# Patient Record
Sex: FEMALE | Race: WHITE
[De-identification: names, ages, dates, MRNs, and addresses within clinical notes are randomized per-mention and may not be internally consistent; named-entity substitution may affect disease eponyms.]

---

## 2017-04-06 ENCOUNTER — HOSPITAL ENCOUNTER (EMERGENCY)
Dept: HOSPITAL 82 - ED | Age: 72
Discharge: HOME | End: 2017-04-06
Payer: MEDICARE

## 2017-04-06 VITALS — DIASTOLIC BLOOD PRESSURE: 83 MMHG | SYSTOLIC BLOOD PRESSURE: 150 MMHG

## 2017-04-06 VITALS — WEIGHT: 167.33 LBS | HEIGHT: 58 IN | BODY MASS INDEX: 35.12 KG/M2
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
## 2017-04-06 DIAGNOSIS — S02.32XA: Primary | ICD-10-CM

## 2017-04-06 DIAGNOSIS — S01.112A: ICD-10-CM

## 2017-04-06 DIAGNOSIS — I10: ICD-10-CM
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
## 2017-04-06 LAB
ALBUMIN SERPL-MCNC: 3.8 G/DL (ref 3.2–5)
ALP SERPL-CCNC: 52 U/L (ref 38–126)
ALT SERPL-CCNC: 46 U/L (ref 11–66)
ANION GAP SERPL CALCULATED.3IONS-SCNC: 14 MMOL/L
AST SERPL-CCNC: 28 U/L (ref 9–36)
BASOPHILS NFR BLD AUTO: 0 % (ref 0–3)
BUN SERPL-MCNC: 23 MG/DL (ref 8–23)
BUN/CREAT SERPL: 19
CALCIUM SERPL-MCNC: 9.7 MG/DL (ref 8.4–10.2)
CHLORIDE SERPL-SCNC: 107 MMOL/L (ref 95–108)
CO2 SERPL-SCNC: 28 MMOL/L (ref 22–30)
CREAT SERPL-MCNC: 1.2 MG/DL (ref 0.5–1)
EOSINOPHIL NFR BLD AUTO: 4 % (ref 0–8)
ERYTHROCYTE [DISTWIDTH] IN BLOOD BY AUTOMATED COUNT: 12.4 % (ref 11.5–15.5)
GLUCOSE SERPL-MCNC: 108 MG/DL (ref 82–115)
HCT VFR BLD AUTO: 40.3 % (ref 37–47)
HGB BLD-MCNC: 13.5 G/DL (ref 12–16)
IMM GRANULOCYTES NFR BLD: 0.2 % (ref 0–1)
LYMPHOCYTES NFR BLD: 30 % (ref 15–41)
MCH RBC QN AUTO: 31.1 PG  CALC (ref 26–32)
MCHC RBC AUTO-ENTMCNC: 33.5 G/L CALC (ref 32–36)
MCV RBC AUTO: 92.9 FL  CALC (ref 80–100)
MONOCYTES NFR BLD AUTO: 8 % (ref 2–13)
NEUTROPHILS # BLD AUTO: 2.75 THOU/UL (ref 2–7.15)
NEUTROPHILS NFR BLD AUTO: 57 % (ref 42–76)
PLATELET # BLD AUTO: 151 THOU/UL (ref 130–400)
POTASSIUM SERPL-SCNC: 4 MMOL/L (ref 3.5–5.1)
PROT SERPL-MCNC: 6.6 G/DL (ref 6.3–8.2)
RBC # BLD AUTO: 4.34 MILL/UL (ref 4.2–5.6)
SODIUM SERPL-SCNC: 145 MMOL/L (ref 137–146)

## 2017-04-06 PROCEDURE — 0HQ1XZZ REPAIR FACE SKIN, EXTERNAL APPROACH: ICD-10-PCS | Performed by: EMERGENCY MEDICINE

## 2017-04-20 ENCOUNTER — HOSPITAL ENCOUNTER (OUTPATIENT)
Dept: HOSPITAL 82 - ED | Age: 72
Setting detail: OBSERVATION
LOS: 1 days | Discharge: HOME | End: 2017-04-21
Payer: MEDICARE

## 2017-04-20 VITALS — BODY MASS INDEX: 35.36 KG/M2 | WEIGHT: 168.44 LBS | HEIGHT: 58 IN

## 2017-04-20 VITALS — SYSTOLIC BLOOD PRESSURE: 163 MMHG | DIASTOLIC BLOOD PRESSURE: 85 MMHG

## 2017-04-20 VITALS — SYSTOLIC BLOOD PRESSURE: 155 MMHG | DIASTOLIC BLOOD PRESSURE: 80 MMHG

## 2017-04-20 DIAGNOSIS — R53.1: Primary | ICD-10-CM

## 2017-04-20 DIAGNOSIS — E87.5: ICD-10-CM

## 2017-04-20 DIAGNOSIS — E86.0: ICD-10-CM

## 2017-04-20 DIAGNOSIS — I10: ICD-10-CM

## 2017-04-20 DIAGNOSIS — R94.31: ICD-10-CM

## 2017-04-20 DIAGNOSIS — R47.01: ICD-10-CM

## 2017-04-20 DIAGNOSIS — R94.6: ICD-10-CM

## 2017-04-20 DIAGNOSIS — Z79.899: ICD-10-CM

## 2017-04-20 LAB
ALBUMIN SERPL-MCNC: 3.3 G/DL (ref 3.2–5)
ALP SERPL-CCNC: 42 U/L (ref 38–126)
ALT SERPL-CCNC: 41 U/L (ref 11–66)
ANION GAP SERPL CALCULATED.3IONS-SCNC: 15 MMOL/L
AST SERPL-CCNC: 25 U/L (ref 9–36)
BASOPHILS NFR BLD AUTO: 0 % (ref 0–3)
BILIRUB UR QL STRIP.AUTO: NEGATIVE
BUN SERPL-MCNC: 60 MG/DL (ref 8–23)
BUN/CREAT SERPL: 58
CALCIUM SERPL-MCNC: 9.1 MG/DL (ref 8.4–10.2)
CHLORIDE SERPL-SCNC: 103 MMOL/L (ref 95–108)
CLARITY UR: CLEAR
CO2 SERPL-SCNC: 25 MMOL/L (ref 22–30)
COLOR UR AUTO: YELLOW
CREAT SERPL-MCNC: 1 MG/DL (ref 0.5–1)
EOSINOPHIL NFR BLD AUTO: 0 % (ref 0–8)
ERYTHROCYTE [DISTWIDTH] IN BLOOD BY AUTOMATED COUNT: 13.2 % (ref 11.5–15.5)
GLUCOSE SERPL-MCNC: 167 MG/DL (ref 82–115)
GLUCOSE UR STRIP.AUTO-MCNC: NEGATIVE MG/DL
HCT VFR BLD AUTO: 46.9 % (ref 37–47)
HGB BLD-MCNC: 15.6 G/DL (ref 12–16)
HGB UR QL STRIP.AUTO: NEGATIVE
IMM GRANULOCYTES NFR BLD: 1.5 % (ref 0–1)
INR PPP: 1 RATIO (ref 0.7–1.3)
KETONES UR STRIP.AUTO-MCNC: NEGATIVE MG/DL
LEUKOCYTE ESTERASE UR QL STRIP.AUTO: NEGATIVE
LYMPHOCYTES NFR BLD: 2 % (ref 15–41)
MAGNESIUM SERPL-MCNC: 2.3 MG/DL (ref 1.6–2.3)
MCH RBC QN AUTO: 31.5 PG  CALC (ref 26–32)
MCHC RBC AUTO-ENTMCNC: 33.3 G/L CALC (ref 32–36)
MCV RBC AUTO: 94.6 FL  CALC (ref 80–100)
MONOCYTES NFR BLD AUTO: 4 % (ref 2–13)
MYOGLOBIN SERPL-MCNC: 61 NG/ML (ref 0–62)
NEUTROPHILS # BLD AUTO: 12.51 THOU/UL (ref 2–7.15)
NEUTROPHILS NFR BLD AUTO: 92 % (ref 42–76)
NITRITE UR QL STRIP.AUTO: NEGATIVE
PH UR STRIP.AUTO: 5.5 [PH] (ref 4.5–8)
PLATELET # BLD AUTO: 207 THOU/UL (ref 130–400)
POTASSIUM SERPL-SCNC: 5.6 MMOL/L (ref 3.5–5.1)
PROT SERPL-MCNC: 6.2 G/DL (ref 6.3–8.2)
PROT UR STRIP.AUTO-MCNC: NEGATIVE MG/DL
PROTHROMBIN TIME: 10.9 SECONDS (ref 9–12.5)
RBC # BLD AUTO: 4.96 MILL/UL (ref 4.2–5.6)
SODIUM SERPL-SCNC: 137 MMOL/L (ref 137–146)
SP GR UR STRIP.AUTO: 1.02
UROBILINOGEN UR QL STRIP.AUTO: 0.2 E.U./DL

## 2017-04-21 VITALS — DIASTOLIC BLOOD PRESSURE: 87 MMHG | SYSTOLIC BLOOD PRESSURE: 153 MMHG

## 2017-04-21 VITALS — DIASTOLIC BLOOD PRESSURE: 76 MMHG | SYSTOLIC BLOOD PRESSURE: 137 MMHG

## 2017-04-21 VITALS — SYSTOLIC BLOOD PRESSURE: 141 MMHG | DIASTOLIC BLOOD PRESSURE: 64 MMHG

## 2017-04-21 VITALS — SYSTOLIC BLOOD PRESSURE: 145 MMHG | DIASTOLIC BLOOD PRESSURE: 68 MMHG

## 2017-04-21 LAB
ANION GAP SERPL CALCULATED.3IONS-SCNC: 12 MMOL/L
BASOPHILS NFR BLD AUTO: 0 % (ref 0–3)
BUN SERPL-MCNC: 48 MG/DL (ref 8–23)
BUN/CREAT SERPL: 55
CALCIUM SERPL-MCNC: 8.6 MG/DL (ref 8.4–10.2)
CHLORIDE SERPL-SCNC: 104 MMOL/L (ref 95–108)
CO2 SERPL-SCNC: 26 MMOL/L (ref 22–30)
CREAT SERPL-MCNC: 0.9 MG/DL (ref 0.5–1)
EOSINOPHIL NFR BLD AUTO: 0 % (ref 0–8)
ERYTHROCYTE [DISTWIDTH] IN BLOOD BY AUTOMATED COUNT: 13 % (ref 11.5–15.5)
GLUCOSE SERPL-MCNC: 134 MG/DL (ref 82–115)
HCT VFR BLD AUTO: 40.9 % (ref 37–47)
HGB BLD-MCNC: 13.6 G/DL (ref 12–16)
IMM GRANULOCYTES NFR BLD: 1.5 % (ref 0–1)
LYMPHOCYTES NFR BLD: 4 % (ref 15–41)
MAGNESIUM SERPL-MCNC: 2 MG/DL (ref 1.6–2.3)
MCH RBC QN AUTO: 31.6 PG  CALC (ref 26–32)
MCHC RBC AUTO-ENTMCNC: 33.3 G/L CALC (ref 32–36)
MCV RBC AUTO: 94.9 FL  CALC (ref 80–100)
MONOCYTES NFR BLD AUTO: 8 % (ref 2–13)
NEUTROPHILS # BLD AUTO: 8.05 THOU/UL (ref 2–7.15)
NEUTROPHILS NFR BLD AUTO: 86 % (ref 42–76)
PLATELET # BLD AUTO: 133 THOU/UL (ref 130–400)
POTASSIUM SERPL-SCNC: 4.6 MMOL/L (ref 3.5–5.1)
RBC # BLD AUTO: 4.31 MILL/UL (ref 4.2–5.6)
SODIUM SERPL-SCNC: 138 MMOL/L (ref 137–146)

## 2019-03-04 ENCOUNTER — HOSPITAL ENCOUNTER (OUTPATIENT)
Dept: HOSPITAL 82 - LAB | Age: 74
Discharge: HOME | End: 2019-03-04
Attending: INTERNAL MEDICINE
Payer: MEDICARE

## 2019-03-04 DIAGNOSIS — E05.90: ICD-10-CM

## 2019-03-04 DIAGNOSIS — I10: Primary | ICD-10-CM

## 2019-03-04 DIAGNOSIS — E78.49: ICD-10-CM

## 2019-03-04 LAB
ALBUMIN SERPL-MCNC: 4.1 G/DL (ref 3.2–5)
ALP SERPL-CCNC: 57 U/L (ref 38–126)
ANION GAP SERPL CALCULATED.3IONS-SCNC: 13 MMOL/L
AST SERPL-CCNC: 23 U/L (ref 9–36)
BASOPHILS NFR BLD AUTO: 0 % (ref 0–3)
BUN SERPL-MCNC: 24 MG/DL (ref 8–23)
BUN/CREAT SERPL: 19
CHLORIDE SERPL-SCNC: 105 MMOL/L (ref 95–108)
CHOLEST SERPL-MCNC: 186 MG/DL (ref 0–199)
CHOLEST/HDLC SERPL: 3.5 {RATIO}
CO2 SERPL-SCNC: 27 MMOL/L (ref 22–30)
CREAT SERPL-MCNC: 1.2 MG/DL (ref 0.5–1)
EOSINOPHIL NFR BLD AUTO: 3 % (ref 0–8)
ERYTHROCYTE [DISTWIDTH] IN BLOOD BY AUTOMATED COUNT: 12.5 % (ref 11.5–15.5)
HCT VFR BLD AUTO: 42.8 % (ref 37–47)
HDLC SERPL-MCNC: 53 MG/DL (ref 40–?)
HGB BLD-MCNC: 14 G/DL (ref 12–16)
IMM GRANULOCYTES NFR BLD: 0.2 % (ref 0–5)
LDLC SERPL CALC-MCNC: 103 MG/DL
LYMPHOCYTES NFR BLD: 33 % (ref 15–41)
MCH RBC QN AUTO: 31.8 PG  CALC (ref 26–32)
MCHC RBC AUTO-ENTMCNC: 32.7 G/L CALC (ref 32–36)
MCV RBC AUTO: 97.3 FL  CALC (ref 80–100)
MONOCYTES NFR BLD AUTO: 7 % (ref 2–13)
NEUTROPHILS # BLD AUTO: 2.74 THOU/UL (ref 2–7.15)
NEUTROPHILS NFR BLD AUTO: 56 % (ref 42–76)
PLATELET # BLD AUTO: 163 THOU/UL (ref 130–400)
POTASSIUM SERPL-SCNC: 4.4 MMOL/L (ref 3.5–5.1)
PROT SERPL-MCNC: 6.4 G/DL (ref 6.3–8.2)
RBC # BLD AUTO: 4.4 MILL/UL (ref 4.2–5.6)
SODIUM SERPL-SCNC: 141 MMOL/L (ref 137–146)
TRIGL SERPL-MCNC: 152 MG/DL (ref 30–149)
TSH SERPL DL<=0.05 MIU/L-ACNC: 0.42 UIU/ML (ref 0.47–4.68)
VLDLC SERPL CALC-MCNC: 30 MG/DL

## 2019-12-04 ENCOUNTER — HOSPITAL ENCOUNTER (EMERGENCY)
Dept: HOSPITAL 82 - ED | Age: 74
Discharge: HOME | End: 2019-12-04
Payer: MEDICARE

## 2019-12-04 VITALS — SYSTOLIC BLOOD PRESSURE: 141 MMHG | DIASTOLIC BLOOD PRESSURE: 75 MMHG

## 2019-12-04 VITALS — BODY MASS INDEX: 31.47 KG/M2 | HEIGHT: 58 IN | WEIGHT: 149.91 LBS

## 2019-12-04 DIAGNOSIS — B34.9: Primary | ICD-10-CM

## 2019-12-04 DIAGNOSIS — J44.9: ICD-10-CM

## 2019-12-04 LAB
BASOPHILS NFR BLD AUTO: 1 % (ref 0–3)
EOSINOPHIL NFR BLD AUTO: 5 % (ref 0–8)
ERYTHROCYTE [DISTWIDTH] IN BLOOD BY AUTOMATED COUNT: 11.9 % (ref 11.5–15.5)
HCT VFR BLD AUTO: 39.6 % (ref 37–47)
HGB BLD-MCNC: 13 G/DL (ref 12–16)
IMM GRANULOCYTES NFR BLD: 0.2 % (ref 0–5)
LYMPHOCYTES NFR BLD: 31 % (ref 15–41)
MCH RBC QN AUTO: 31.1 PG  CALC (ref 26–32)
MCHC RBC AUTO-ENTMCNC: 32.8 G/L CALC (ref 32–36)
MCV RBC AUTO: 94.7 FL  CALC (ref 80–100)
MONOCYTES NFR BLD AUTO: 9 % (ref 2–13)
NEUTROPHILS # BLD AUTO: 2.86 THOU/UL (ref 2–7.15)
NEUTROPHILS NFR BLD AUTO: 54 % (ref 42–76)
PLATELET # BLD AUTO: 216 THOU/UL (ref 130–400)
RBC # BLD AUTO: 4.18 MILL/UL (ref 4.2–5.6)

## 2021-01-31 ENCOUNTER — HOSPITAL ENCOUNTER (EMERGENCY)
Dept: HOSPITAL 82 - ED | Age: 76
Discharge: HOME | End: 2021-01-31
Payer: MEDICARE

## 2021-01-31 VITALS — HEIGHT: 58 IN | WEIGHT: 149.91 LBS | BODY MASS INDEX: 31.47 KG/M2

## 2021-01-31 VITALS — DIASTOLIC BLOOD PRESSURE: 56 MMHG | SYSTOLIC BLOOD PRESSURE: 100 MMHG

## 2021-01-31 DIAGNOSIS — R47.01: ICD-10-CM

## 2021-01-31 DIAGNOSIS — T17.998A: Primary | ICD-10-CM
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
## 2021-01-31 DIAGNOSIS — J44.9: ICD-10-CM
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
## 2021-01-31 LAB
ANION GAP SERPL CALCULATED.3IONS-SCNC: 13 MMOL/L
BASOPHILS NFR BLD AUTO: 1 % (ref 0–3)
BUN SERPL-MCNC: 43 MG/DL (ref 8–23)
BUN/CREAT SERPL: 27
CHLORIDE SERPL-SCNC: 115 MMOL/L (ref 95–108)
CO2 SERPL-SCNC: 27 MMOL/L (ref 22–30)
CREAT SERPL-MCNC: 1.6 MG/DL (ref 0.5–1)
EOSINOPHIL NFR BLD AUTO: 2 % (ref 0–8)
ERYTHROCYTE [DISTWIDTH] IN BLOOD BY AUTOMATED COUNT: 12.8 % (ref 11.5–15.5)
HCT VFR BLD AUTO: 47.7 % (ref 37–47)
HGB BLD-MCNC: 14.8 G/DL (ref 12–16)
IMM GRANULOCYTES NFR BLD: 0.2 % (ref 0–5)
LYMPHOCYTES NFR BLD: 25 % (ref 15–41)
MCH RBC QN AUTO: 31.6 PG  CALC (ref 26–32)
MCHC RBC AUTO-ENTMCNC: 31 G/DL CAL (ref 32–36)
MCV RBC AUTO: 101.7 FL  CALC (ref 80–100)
MONOCYTES NFR BLD AUTO: 8 % (ref 2–13)
NEUTROPHILS # BLD AUTO: 5.6 THOU/UL (ref 2–7.15)
NEUTROPHILS NFR BLD AUTO: 64 % (ref 42–76)
PLATELET # BLD AUTO: 253 THOU/UL (ref 130–400)
POTASSIUM SERPL-SCNC: 4.3 MMOL/L (ref 3.5–5.1)
RBC # BLD AUTO: 4.69 MILL/UL (ref 4.2–5.6)
SODIUM SERPL-SCNC: 151 MMOL/L (ref 137–146)

## 2021-02-18 ENCOUNTER — HOSPITAL ENCOUNTER (INPATIENT)
Dept: HOSPITAL 82 - ED | Age: 76
LOS: 6 days | Discharge: HOSPICE | DRG: 640 | End: 2021-02-24
Attending: INTERNAL MEDICINE | Admitting: INTERNAL MEDICINE
Payer: MEDICARE

## 2021-02-18 VITALS — BODY MASS INDEX: 27.87 KG/M2 | WEIGHT: 138.23 LBS | HEIGHT: 59 IN

## 2021-02-18 VITALS — DIASTOLIC BLOOD PRESSURE: 63 MMHG | SYSTOLIC BLOOD PRESSURE: 125 MMHG

## 2021-02-18 DIAGNOSIS — Z51.5: ICD-10-CM

## 2021-02-18 DIAGNOSIS — E87.6: ICD-10-CM

## 2021-02-18 DIAGNOSIS — E86.0: ICD-10-CM

## 2021-02-18 DIAGNOSIS — G31.01: ICD-10-CM

## 2021-02-18 DIAGNOSIS — U07.1: ICD-10-CM

## 2021-02-18 DIAGNOSIS — N18.30: ICD-10-CM

## 2021-02-18 DIAGNOSIS — J44.9: ICD-10-CM

## 2021-02-18 DIAGNOSIS — Z85.42: ICD-10-CM

## 2021-02-18 DIAGNOSIS — E86.9: ICD-10-CM

## 2021-02-18 DIAGNOSIS — J69.0: ICD-10-CM

## 2021-02-18 DIAGNOSIS — N17.9: ICD-10-CM

## 2021-02-18 DIAGNOSIS — M10.9: ICD-10-CM

## 2021-02-18 DIAGNOSIS — E03.9: ICD-10-CM

## 2021-02-18 DIAGNOSIS — K29.70: ICD-10-CM

## 2021-02-18 DIAGNOSIS — R53.1: ICD-10-CM

## 2021-02-18 DIAGNOSIS — I12.9: ICD-10-CM

## 2021-02-18 DIAGNOSIS — E87.0: Primary | ICD-10-CM

## 2021-02-18 DIAGNOSIS — M62.82: ICD-10-CM

## 2021-02-18 DIAGNOSIS — Z99.3: ICD-10-CM

## 2021-02-18 DIAGNOSIS — Z66: ICD-10-CM

## 2021-02-18 DIAGNOSIS — F02.80: ICD-10-CM

## 2021-02-18 LAB
ALBUMIN SERPL-MCNC: 3.6 G/DL (ref 3.2–5)
ALP SERPL-CCNC: 82 U/L (ref 38–126)
ANION GAP SERPL CALCULATED.3IONS-SCNC: 14 MMOL/L
ANION GAP SERPL CALCULATED.3IONS-SCNC: 6 MMOL/L
AST SERPL-CCNC: 82 U/L (ref 9–36)
BASOPHILS NFR BLD AUTO: 0 % (ref 0–3)
BILIRUB UR QL STRIP.AUTO: NEGATIVE
BUN SERPL-MCNC: 101 MG/DL (ref 8–23)
BUN SERPL-MCNC: 86 MG/DL (ref 8–23)
BUN/CREAT SERPL: 34
BUN/CREAT SERPL: 34
CHLORIDE SERPL-SCNC: 134 MMOL/L (ref 95–108)
CHLORIDE SERPL-SCNC: 136 MMOL/L (ref 95–108)
CO2 SERPL-SCNC: 24 MMOL/L (ref 22–30)
CO2 SERPL-SCNC: 28 MMOL/L (ref 22–30)
COLOR UR AUTO: YELLOW
CREAT SERPL-MCNC: 2.5 MG/DL (ref 0.5–1)
CREAT SERPL-MCNC: 3 MG/DL (ref 0.5–1)
EOSINOPHIL NFR BLD AUTO: 0 % (ref 0–8)
ERYTHROCYTE [DISTWIDTH] IN BLOOD BY AUTOMATED COUNT: 13.9 % (ref 11.5–15.5)
GLUCOSE UR STRIP.AUTO-MCNC: NEGATIVE MG/DL
HCT VFR BLD AUTO: 49.4 % (ref 37–47)
HGB BLD-MCNC: 14.5 G/DL (ref 12–16)
HGB UR QL STRIP.AUTO: (no result)
IMM GRANULOCYTES NFR BLD: 0.4 % (ref 0–5)
KETONES UR STRIP.AUTO-MCNC: NEGATIVE MG/DL
LEUKOCYTE ESTERASE UR QL STRIP.AUTO: NEGATIVE
LYMPHOCYTES NFR BLD: 12 % (ref 15–41)
MCH RBC QN AUTO: 31.5 PG  CALC (ref 26–32)
MCHC RBC AUTO-ENTMCNC: 29.4 G/DL CAL (ref 32–36)
MCV RBC AUTO: 107.2 FL  CALC (ref 80–100)
MONOCYTES NFR BLD AUTO: 8 % (ref 2–13)
MYOGLOBIN SERPL-MCNC: 2436 NG/ML (ref 0–62)
NEUTROPHILS # BLD AUTO: 5.39 THOU/UL (ref 2–7.15)
NEUTROPHILS NFR BLD AUTO: 79 % (ref 42–76)
NITRITE UR QL STRIP.AUTO: NEGATIVE
PH UR STRIP.AUTO: 5.5 [PH] (ref 4.5–8)
PLATELET # BLD AUTO: 110 THOU/UL (ref 130–400)
POTASSIUM SERPL-SCNC: 3.4 MMOL/L (ref 3.5–5.1)
POTASSIUM SERPL-SCNC: 3.9 MMOL/L (ref 3.5–5.1)
PROT SERPL-MCNC: 6.4 G/DL (ref 6.3–8.2)
PROT UR QL STRIP.AUTO: NEGATIVE MG/DL
RBC # BLD AUTO: 4.61 MILL/UL (ref 4.2–5.6)
SODIUM SERPL-SCNC: 165 MMOL/L (ref 137–146)
SODIUM SERPL-SCNC: 170 MMOL/L (ref 137–146)
SP GR UR STRIP.AUTO: >=1.03
UROBILINOGEN UR QL STRIP.AUTO: 0.2 E.U./DL

## 2021-02-18 PROCEDURE — B518ZZA FLUOROSCOPY OF SUPERIOR VENA CAVA, GUIDANCE: ICD-10-PCS | Performed by: RADIOLOGY

## 2021-02-18 PROCEDURE — 02HV33Z INSERTION OF INFUSION DEVICE INTO SUPERIOR VENA CAVA, PERCUTANEOUS APPROACH: ICD-10-PCS | Performed by: RADIOLOGY

## 2021-02-18 PROCEDURE — 0T9B70Z DRAINAGE OF BLADDER WITH DRAINAGE DEVICE, VIA NATURAL OR ARTIFICIAL OPENING: ICD-10-PCS | Performed by: EMERGENCY MEDICINE

## 2021-02-18 NOTE — NUR
REPORT CALLED TO HANNY LOWERY. ADVISED OF ALL EVENTS ATTACHMENTS MEDS POC.
HOSPICE CONSULT. [facility]

## 2021-02-18 NOTE — NUR
RECIVED CALL FROM LAB THAT REPORTED CRITICAL LAB VALUES. RESULTS CALLED INTO
ON CALL PHYSICIAN. BUN 86, CHLORIDA 134, SODIUM 165. ACCODING TO RECORDS AND
PHYSICIAN RESULTS ARE TRENDING DOWN. PHYSICIAN INDICATED THAT AS LONG AS
RESULTS CONTINUE TO TREND DOWN HE DOES NOT REQUIRE A PHONE CALL IN THE AM.
WILL MONITOR.

## 2021-02-18 NOTE — NUR
PT ARRIVED TO [facility] ROOM 290 VIA STRETCHER ACCOMPAINED BY ER STAFF. PT IS
NON VERBAL.  AT BEDSIDE TO ASSIST WITH COMPLETION OF ADMINSION.
APPROVED BY HOUSE SUPERVISOR, PPE ADMINISTERED, WAIVER SIGNED.ASSESSMENT AND
VITALS OBTAINED.  RESPIRATIONS ARE EVEN AND UNLABORED ON ROOM AIR. GURINDER PICC
INFUSING WITH IVF PER ORDER, SITE APPEARS HEALTHY AND PATENT. UP CATHATER
IN PLACE. ALL SAFTEY PRECAUTIONS ARE IN PLACE WITH CALL LIGHT IN
REACH.AIR/CONTACT PRECAUTIONS ARE IN PLACE. WILL CONTINUE TO MONITOR

## 2021-02-18 NOTE — NUR
SEVERAL ATTEMPTS TO OBTAIN IV BY THREE NURSES WITHOUT SUCCESS. PT STATES PT HAD
POOR INTAKE FOR TWO DAYS.

## 2021-02-18 NOTE — NUR
PT RESTING QUIETLY IN BED, NO S/S OF DISTRESS. BREATHING EVEN AND UNLABORED.
PICC TO GURINDER REMAINS PATENT AND FLUSHES EASILY. ADMISSION COMPLETED AS PER
POLICY,  AT BEDSIDE ASSISTED. WILL MONITOR.

## 2021-02-18 NOTE — NUR
PT RETURNED FROM RADIOLOGY W/PICC TO [facility]. SITE HEALTHY.

## 2021-02-18 NOTE — NUR
DISCUSSED SOME CONCERNS WITH  OF PT. HE WAS NOT HAPPY WITH THE FACT THAT
PT IS BEING "STUCK" FOR THE ATTEMPT OF AN IV ACCESS.  IS
NOTIFIED THAT OTHER ALTERNATIVES ARE UNDERWAY IN ORDER TO GAIN ACCESS. MD AT
BEDSIDE AND  AFTER DISCUSSION VERBALIZED UNDERSTANDING.

## 2021-02-18 NOTE — NUR
PER SPOUSE PT HAS A HOSPICE CONSULT BY PCP. HOSPICE CALLED AND STATED THEY
WOULD NEED AN IN HOUSE HOSPITAL REFERAL TO FOLLOW PT THERE FOR INTAKE. SPOUSE
STATED HE IS OK WITH THAT.

## 2021-02-19 VITALS — DIASTOLIC BLOOD PRESSURE: 58 MMHG | SYSTOLIC BLOOD PRESSURE: 99 MMHG

## 2021-02-19 VITALS — SYSTOLIC BLOOD PRESSURE: 115 MMHG | DIASTOLIC BLOOD PRESSURE: 60 MMHG

## 2021-02-19 VITALS — DIASTOLIC BLOOD PRESSURE: 55 MMHG | SYSTOLIC BLOOD PRESSURE: 111 MMHG

## 2021-02-19 VITALS — SYSTOLIC BLOOD PRESSURE: 124 MMHG | DIASTOLIC BLOOD PRESSURE: 68 MMHG

## 2021-02-19 VITALS — DIASTOLIC BLOOD PRESSURE: 61 MMHG | SYSTOLIC BLOOD PRESSURE: 118 MMHG

## 2021-02-19 VITALS — SYSTOLIC BLOOD PRESSURE: 99 MMHG | DIASTOLIC BLOOD PRESSURE: 38 MMHG

## 2021-02-19 LAB
ALBUMIN SERPL-MCNC: 2.4 G/DL (ref 3.2–5)
ANION GAP SERPL CALCULATED.3IONS-SCNC: 6 MMOL/L
BUN SERPL-MCNC: 78 MG/DL (ref 8–23)
BUN SERPL-MCNC: 78 MG/DL (ref 8–23)
BUN/CREAT SERPL: 34
CHLORIDE SERPL-SCNC: 129 MMOL/L (ref 95–108)
CHLORIDE SERPL-SCNC: 129 MMOL/L (ref 95–108)
CK SERPL-CCNC: 898 U/L (ref 30–165)
CO2 SERPL-SCNC: 28 MMOL/L (ref 22–30)
CO2 SERPL-SCNC: 29 MMOL/L (ref 22–30)
CREAT SERPL-MCNC: 2.3 MG/DL (ref 0.5–1)
CREAT SERPL-MCNC: 2.3 MG/DL (ref 0.5–1)
CRP SERPL-MCNC: 4.6 MG/DL (ref 0–0.9)
ERYTHROCYTE [DISTWIDTH] IN BLOOD BY AUTOMATED COUNT: 13.6 % (ref 11.5–15.5)
HCT VFR BLD AUTO: 38.9 % (ref 37–47)
HGB BLD-MCNC: 11.3 G/DL (ref 12–16)
MAGNESIUM SERPL-MCNC: 2.4 MG/DL (ref 1.6–2.3)
MCH RBC QN AUTO: 31 PG  CALC (ref 26–32)
MCHC RBC AUTO-ENTMCNC: 29 G/DL CAL (ref 32–36)
MCV RBC AUTO: 106.9 FL  CALC (ref 80–100)
MYOGLOBIN SERPL-MCNC: 1212 NG/ML (ref 0–62)
PLATELET # BLD AUTO: 77 THOU/UL (ref 130–400)
POTASSIUM SERPL-SCNC: 3.3 MMOL/L (ref 3.5–5.1)
POTASSIUM SERPL-SCNC: 3.3 MMOL/L (ref 3.5–5.1)
RBC # BLD AUTO: 3.64 MILL/UL (ref 4.2–5.6)
SODIUM SERPL-SCNC: 159 MMOL/L (ref 137–146)
SODIUM SERPL-SCNC: 160 MMOL/L (ref 137–146)

## 2021-02-19 NOTE — NUR
RECEIVED NOTIFICATION THAT PT HAS A CRITICAL LAB VALUE, MYOGLOBIN 1212.
NOTIFIED ON CALL APRN WITH CRITICAL LAB VALUE. NO NEW ORDERS RECEIVED.

## 2021-02-19 NOTE — NUR
BLOOD REQUESTED FROM LAB ON PT. SAMPLE OBTAINED VIA PICC LINE. PT RESTING
QUIETLY IN BED WITH HER EYES CLOSED. PT IS NON-VERBAL. NO S/S OF PAIN OR
DISCOMFORT. WILL MONITOR

## 2021-02-19 NOTE — NUR
PT RESTING IN SEMI FOWLERS POSITION;RESPIRATIONS EVEN AND UNLABORED ON RA;PT
REMAINS NON-VERBAL WITH NO S/S OF DISTRESS NOTED;TELE MONITORING IN PLACE;GURINDER
PICCLINE REMAINS PATENT INFUSING IVF WITH EASE;UP CATHETER DRAINING TO
GRAVITY;NPO DIET REINFORCED;ALL SAFETY PRECAUTIONS REMAIN IN PLACE WITH CALL
LIGHT IN REACH;WILL CONTINUE TO MONITOR

## 2021-02-19 NOTE — NUR
PT AWAKE IN HER ROOM LYING IN BED. NO S/S OF DISTRESS NOTED. BREATHING EVEN
AND UNLABORED. LUNGS CTA. NO S/S OF PAIN NOTED. WILL CONTINUE TO MONITOR.

## 2021-02-19 NOTE — NUR
PT RESTING IN SEMI FOWLERS POSITION,NON-VERBAL AT THIS TIME;VS OBTAINED AND
ASSESSMENT COMPLETED;NO S/S OF DISTRESS NOTED;RESPIRATIONS EVEN AND UNLABORED
ON RA,DIMINISHED LUNG SOUNDS NOTED;ABDOMEN SOFT ON PALPATION AND ACTIVE IN ALL
4 QUADRANTS;UP CATHETER IN PLACE DRAINING CLEAR/YELLOW URINE TO GRAVITY
WITH EASE,STAT LOCK NOTED TO LEFT THIGH;WEAK PEDAL PULSES WITH HEEL PROTECTORS
IN PLACE AND FEET OFF LOADED ON A PILLOW;SKIN INTACT;TELE MONITORING IN
PLACE;GURINDER SL PICCLINE INFUSING D5W @ 120ML/HR,SITE APPEARS HEALTHY;PT REMAINS
NPO AT THIS TIME;AIR/CONTACT PRECAUTIONS IN PLACE FOR COVID19 DX;ALL SAFETY
PRECAUTIONS REMAIN IN PLACE WITH BED IN THE LOWEST POSITION AND CALL LIGHT IN
REACH;WILL CONTINUE TO MONITOR

## 2021-02-19 NOTE — NUR
SLP spoke with physician and Kenroy DE JESUS about pt status. Medical team
requested pt stay NPO d/t pending procedure. SLP will continue to monitor and
f/u. Pt is a previous outpatient. She responds to yes/no questions with thumbs
up or thumbs down and reaching towards the desired response. Kaye also
experiences right side neglect from her PPA diagnosis.

## 2021-02-19 NOTE — NUR
RAPID NASAL SWAB COLLECTED AT THIS TIME PER ORDER TO DETECT COVID19, PT
TOLERATED WELL AND SAMPLE SENT TO LAB.

## 2021-02-19 NOTE — NUR
PT RESTING IN SEMI FOWLERS POSITION,REMAINS NON-VERBAL;RESPIRATIONS EVEN AND
UNLABORED ON RA;NO S/S OF DISTRESS NOTED;TELE MONITORING IN PLACE;GURINDER PICCLINE
CONTINUES TO INFUSE IVF WITH EASE;UP CATHETER PATENT;PT RE-POSIITONED ON
TO RIGHT SIDE LAYING POSITION AT THIS TIME;PT REMAINS NPO;ALL SAFETY
PRECAUTIONS IN PLACE WITH BED IN THE LOWEST POSITION AND CALL LIGHT IN
REACH;WILL CONTINUE TO MONITOR

## 2021-02-19 NOTE — NUR
PT RESTING QUIETLY IN BED. NON-VERBAL. NO S/S OF PAIN OR DISTRESS AT THIS
TIME. BREATHING EVEN AND UNLABORED. PT REMAINS NPO DUE TO APHASIA. WILL
MONITOR.

## 2021-02-19 NOTE — NUR
Patient is screened dfor physical medicine intervention and it is felt that PT
OT and ST consults would benefit this patient if medical agrees

## 2021-02-20 VITALS — DIASTOLIC BLOOD PRESSURE: 42 MMHG | SYSTOLIC BLOOD PRESSURE: 81 MMHG

## 2021-02-20 VITALS — DIASTOLIC BLOOD PRESSURE: 45 MMHG | SYSTOLIC BLOOD PRESSURE: 108 MMHG

## 2021-02-20 VITALS — SYSTOLIC BLOOD PRESSURE: 121 MMHG | DIASTOLIC BLOOD PRESSURE: 63 MMHG

## 2021-02-20 VITALS — DIASTOLIC BLOOD PRESSURE: 47 MMHG | SYSTOLIC BLOOD PRESSURE: 95 MMHG

## 2021-02-20 VITALS — SYSTOLIC BLOOD PRESSURE: 110 MMHG | DIASTOLIC BLOOD PRESSURE: 47 MMHG

## 2021-02-20 VITALS — DIASTOLIC BLOOD PRESSURE: 58 MMHG | SYSTOLIC BLOOD PRESSURE: 109 MMHG

## 2021-02-20 VITALS — SYSTOLIC BLOOD PRESSURE: 102 MMHG | DIASTOLIC BLOOD PRESSURE: 50 MMHG

## 2021-02-20 LAB
ALBUMIN SERPL-MCNC: 2.2 G/DL (ref 3.2–5)
ALP SERPL-CCNC: 50 U/L (ref 38–126)
ANION GAP SERPL CALCULATED.3IONS-SCNC: 9 MMOL/L
AST SERPL-CCNC: 83 U/L (ref 9–36)
BASOPHILS NFR BLD AUTO: 0 % (ref 0–3)
BUN SERPL-MCNC: 45 MG/DL (ref 8–23)
BUN/CREAT SERPL: 26
CHLORIDE SERPL-SCNC: 113 MMOL/L (ref 95–108)
CO2 SERPL-SCNC: 25 MMOL/L (ref 22–30)
CREAT SERPL-MCNC: 1.7 MG/DL (ref 0.5–1)
EOSINOPHIL NFR BLD AUTO: 1 % (ref 0–8)
ERYTHROCYTE [DISTWIDTH] IN BLOOD BY AUTOMATED COUNT: 12.4 % (ref 11.5–15.5)
HCT VFR BLD AUTO: 36.7 % (ref 37–47)
HGB BLD-MCNC: 11.3 G/DL (ref 12–16)
IMM GRANULOCYTES NFR BLD: 0.6 % (ref 0–5)
LYMPHOCYTES NFR BLD: 32 % (ref 15–41)
MCH RBC QN AUTO: 31.4 PG  CALC (ref 26–32)
MCHC RBC AUTO-ENTMCNC: 30.8 G/DL CAL (ref 32–36)
MCV RBC AUTO: 101.9 FL  CALC (ref 80–100)
MONOCYTES NFR BLD AUTO: 8 % (ref 2–13)
NEUTROPHILS # BLD AUTO: 1.88 THOU/UL (ref 2–7.15)
NEUTROPHILS NFR BLD AUTO: 58 % (ref 42–76)
PLATELET # BLD AUTO: 66 THOU/UL (ref 130–400)
POTASSIUM SERPL-SCNC: 3.2 MMOL/L (ref 3.5–5.1)
PROT SERPL-MCNC: 4.1 G/DL (ref 6.3–8.2)
RBC # BLD AUTO: 3.6 MILL/UL (ref 4.2–5.6)
SODIUM SERPL-SCNC: 144 MMOL/L (ref 137–146)

## 2021-02-20 NOTE — NUR
PT RESTING IN SEMI FOWLERS POSITION,NON-VERBAL;HX OF STROKE;VS OBTAINED AND
ASSESSMENT COMPLETED;NO S/S OF DISTRESS NOTED;RESPIRATIONS SHALLOW ON
RA,DIMINISHED LUNG SOUNDS NOTED;ABDOMEN DISTENDED/SOFT ON PALPATION AND ACTIVE
IN ALL 4 QUADRANTS;UP CATHETER PATENT DRAINING CLEAR/YELLOW URINE TO
GRAVITY WITH EASE,STAT LOCK NOTED LEFT THIGH;WEAK PEDAL PULSES;SKIN INTACT,
REDDENING NOTED TO COCCYX;GURINDER SL PICCLINE INFUSING D5W @ 120ML/HR,SITE APPEARS
HEALTHY;TELE MONITORING IN PLACE;COMPLETE BED BATH AND ORAL CARE
PROVIDED;PT RE-POSITIONED;NPO DIET REINFORCED;PT REMAINS IN AIR/CONTACT
PRECAUTIONS DUE TO COVID19 DX;ALL SAFETY PRECAUTIONS REINFORCED WITH BED IN
THE LOWEST POSITION AND CALL LIGHT IN REACH;WILL CONTINUE TO MONITOR

## 2021-02-20 NOTE — NUR
PT RESTING IN SEMI FOWLERS POSITION;RESPIRATIONS EVEN AND UNLABORED,SHALLOW ON
O2 @ 2L VIA NC;NO S/S OF DISTRESS NOTED, PT REMAINS NON-VERBAL;TELE
MONITORING IN PLACE;GURINDER PICCLINE PATENT INFUSING IVF WITH EASE;UP CATHETER
DRAINING TO GRAVITY;ALL SAFETY PRECAUTIONS IN PLACE WITH CALL LIGHT IN
REACH;WILL CONTINUE TO MONITOR

## 2021-02-20 NOTE — NUR
PT NON VERBAL, EYES ABLE TO FOLLOW NURSE, NO SIGNS OF RESPIRATORY DISTRESS,
PICC LINE RUE FLUIDS CONTINUE TO RUN. NO SIGN OF PAIN. UP STILL INTACT.
NPO. CALL LIGHT WITHIN RANGE WILL CONTINUE TO MONITOR

## 2021-02-20 NOTE — NUR
PT RESTING. UP STILL IN PLACE. FLUIDS STILL RUNNING. STEVE LIGHT WITHIN REACH
WILL CONTINUE TO MONITOR

## 2021-02-20 NOTE — NUR
called asking about wife and condition. Wanted to know if peg tube
will be placed and would like a physican to call him so he can know whats
going on. elvin relay the message to morning shift nurse. pt continues sleeping
peacefully in bed with iv fluids continue running

## 2021-02-20 NOTE — NUR
PT NON VERBAL. EYES OPEN TO VOICE. CNA CALLS NURSE PT STATS WERE IN THE 70'S.
PT LAYING ON HER BACK SEMI JEFF POSITION. REPOSITION PT ON HER RITH SIDE AND
STATS JUMP TO 99.  PICC LINE GURINDER AND FLUID RUNNING AT 120ML/HR. SHE IS
COMFORTABLE AND SLEEPING. WILL CONTINUE TO MONITOR.

## 2021-02-20 NOTE — NUR
PT RESTING IN RIGHT SIDE LAYING POSITION, REMAINS NON-VERBAL;RESPIRATIONS
SHALLOW ON RA WITH O2 SATS HIGH 80'S-LOW 90'S;PT PLACED ON O2 @ 2L VIA NC AND
O2 SATS ANDREZ TO 95%;NO S/S OF DISTRESS NOTED;TELE MONITORING IN PLACE;IV
FLUIDS CONTINUE TO INFUSE TO GURINDER PICCLINE WITH EASE;UP CATHETER PATENT;ALL
SAFETY PRECAUTIONS IN PLACE WITH CALL LIGHT IN REACH;WILL CONTINUE TO MONITOR

## 2021-02-21 VITALS — SYSTOLIC BLOOD PRESSURE: 110 MMHG | DIASTOLIC BLOOD PRESSURE: 60 MMHG

## 2021-02-21 VITALS — DIASTOLIC BLOOD PRESSURE: 50 MMHG | SYSTOLIC BLOOD PRESSURE: 100 MMHG

## 2021-02-21 VITALS — SYSTOLIC BLOOD PRESSURE: 169 MMHG | DIASTOLIC BLOOD PRESSURE: 47 MMHG

## 2021-02-21 VITALS — DIASTOLIC BLOOD PRESSURE: 37 MMHG | SYSTOLIC BLOOD PRESSURE: 126 MMHG

## 2021-02-21 VITALS — DIASTOLIC BLOOD PRESSURE: 63 MMHG | SYSTOLIC BLOOD PRESSURE: 101 MMHG

## 2021-02-21 LAB
ALBUMIN SERPL-MCNC: 2.3 G/DL (ref 3.2–5)
ALP SERPL-CCNC: 50 U/L (ref 38–126)
ANION GAP SERPL CALCULATED.3IONS-SCNC: 10 MMOL/L
AST SERPL-CCNC: 93 U/L (ref 9–36)
BUN SERPL-MCNC: 28 MG/DL (ref 8–23)
BUN/CREAT SERPL: 18
CHLORIDE SERPL-SCNC: 106 MMOL/L (ref 95–108)
CO2 SERPL-SCNC: 23 MMOL/L (ref 22–30)
CREAT SERPL-MCNC: 1.5 MG/DL (ref 0.5–1)
ERYTHROCYTE [DISTWIDTH] IN BLOOD BY AUTOMATED COUNT: 11.9 % (ref 11.5–15.5)
HCT VFR BLD AUTO: 37.2 % (ref 37–47)
HGB BLD-MCNC: 12.2 G/DL (ref 12–16)
MCH RBC QN AUTO: 31.4 PG  CALC (ref 26–32)
MCHC RBC AUTO-ENTMCNC: 32.8 G/DL CAL (ref 32–36)
MCV RBC AUTO: 95.6 FL  CALC (ref 80–100)
PLATELET # BLD AUTO: 70 THOU/UL (ref 130–400)
POTASSIUM SERPL-SCNC: 3.3 MMOL/L (ref 3.5–5.1)
PROT SERPL-MCNC: 4.4 G/DL (ref 6.3–8.2)
RBC # BLD AUTO: 3.89 MILL/UL (ref 4.2–5.6)
SODIUM SERPL-SCNC: 137 MMOL/L (ref 137–146)

## 2021-02-21 NOTE — NUR
CALLED THIS WRITTER TO GET APPROVAL FOR SPOUSE TO VISIT PATIENT. PT
CURRENTLY UNDER COVID 19 PRECAUTIONS.

## 2021-02-21 NOTE — NUR
PT RESTING IN SEMI FOWLERS POSITION;RESPIRATIONS REMAIN SHALLOW ON O2 @ 2L VIA
NC AND ABDOMINAL BREATHING HAS NORMALIZED;NO S/S OF DISTRESS NOTED;IVF
CONTINUE TO INFUSE TO GURINDER PICCLINE AND ABX STARTED;UP CATHETER IN
PATENT;TELE MONITORING IN PLACE;ALL SAFETY PRECAUTIONS IN PLACE WITH CALL
LIGHT IN REACH;FREQUENT ROUNDS MADE.

## 2021-02-21 NOTE — NUR
PT APPEARS TO BE SLEEPING IN SEMI FOWLERS POSITION;RESPIRATIONS EVEN AND
UNLABORED ON O2 @ 2L VIA NC;NO S/S OF DISTRESS NOTED;TELE MONITORING IN
PLACE;GURINDER PICCLINE INFUSING WITH EASE;UP CATHETER PATENT;NPO DIET REMAINS
IN PLACE;ALL SAFETY PRECAUTIONS NOTED WITH BED IN THE LOWEST POSITION AND CALL
LIGHT IN REACH;WILL CONTINUE TO MONTIOR

## 2021-02-21 NOTE — NUR
PT RESTING IN SEMI FOWLERS POSITION,NON-VERBAL;VS OBTAINED AND ASSESSMENT
COMPLETED;PT APPEARS FLUSH THIS MORNING, NO TEMP ON ASSESSMENT;RESPIRATIONS
SHALLOW ON O2 @ 2L VIA NC WITH ABDOMINAL BREATHING NOTED AND PRODUCTIVE
COUGH;SPUTUM SCANT/THICK & WHITE IN NATURE;WHEEZY/WET LUNG SOUNDS
NOTED;ABDOMEN DISTENDED/SOFT ON PALPATION AND ACTIVE IN ALL 4 QUADRANTS;UP
CATHETER IN PLACE DRAINING CLEAR/YELLOW URINE TO GRAVITY WITH EASE,STAT LOCK
LEFT THIGH;WEAK PEDAL PULSES, FEET OFFLOADED ON A PILLOW AND HEEL PROTECTORS
IN PLACE;TELE MONITORING NOTED;GURINDER SL PICCLINE INFUSING D5W @ 75ML/HR,SITE
APPEARS HEALTHY;NPO DIET REINFORCED;COMPLETE BED BATH AND LINEN CHANGE
PROVIDED;PT REMAINS IN AIR/CONTACT PRECAUTIONS DUE TO COVID19 DX;ALL SAFETY
PRECAUTIONS REMAIN IN PLACE WITH BED IN THE LOWEST POSITION AND CALL LIGHT IN
REACH;WILL CONTINUE TO MONITOR

## 2021-02-21 NOTE — NUR
REPOSITION PT IN THE BED. STATS WITHIN RANGE. TELE SHOWS SR. FLUIDS CONTINUE
RUNNING. WILL CONTINUE TO MONITOR AT THIS TIME

## 2021-02-21 NOTE — NUR
PT RESTING IN RIGHT SIDE LAYING POSITION WITH EYES OPEN,REMAINS
NON-VERBAL;SPOUSE AT BEDSIDE;RESPIRATIONS SHALLOW ON O2 @ 2L VIA NC;UP
CATHETER REMAINS PATENT DRAINING TO GRAVITY WITH EASE;GURINDER PICCLINE INFUSING
WITH EASE PER ORDER;TELE MONITORING IN PLACE;ALL SAFETY PRECAUTIONS IN PLACE
WITH BED IN THE LOWEST POSITION AND CALL LIGHT IN REACH;WILL CONTINUE TO
MONITOR

## 2021-02-21 NOTE — NUR
CALL RECEIVED BY LOBBRET THAT PT SPOUSE WAS IN HOSPITAL LOBBY AND REQUESTING TO
TALK WITH [employer]. StormWindITTER SPOKE WITH SPOUSE;SPOUSE REPORTS THAT HE HAS BEEN
TRYING TO GET IN CONTACT WITH MD ALL DAY AND HAS NOT HAD ANY LUCK.[employer] DID
NOTIFY  THIS MORNING OF SPOUSE REQUEST; SPOUSE STATES "YOU GIVE
 THIS MESSAGE, HE HAS 30 MINS LEFT TO CALL ME OR I WILL TAKE HER OUT
AMA, CALL THE , AND LATASHA HIM".MD TO BE NOTIFIED.

## 2021-02-21 NOTE — NUR
INFORMED CONSENT OBTAINED AT THIS TIME BY MARCUS (SPOUSE) FOR INSERTION OF
PERCUTANEOUS ENDOSCOPIC GASTROSTOMY.ALL RISKS AND BENEFITS DISCUSSED AND
SPOUSE VERBALIZES UNDERSTANDING.

## 2021-02-21 NOTE — NUR
PT REMAINS SLEEPING. FLUIDS CONTINUE. O2 CONTINUES AT 2 L. UP CATH FLOW
PATNET. WILL CONTINUE TO MONIOR

## 2021-02-21 NOTE — NUR
PT RESTING IN BED. NON VERBAL. IV STILL RUNNING. PICC MARZENA. JEOVANNY PATENT.
OXYGEN CONTINUES RUNNING AT 2L. CNA LET NURSE KNOW OXYGEN WAS 89. WE
REPOSITION PT AND OXYGEN WENT UP TO 92. NO SIGN OF RESPIRATORY DISTRESS. WILL
CONTINUE TO MONITOR

## 2021-02-21 NOTE — NUR
RELEASE WAVIER/LIABILITY SIGNED AT THIS TIME;PT EDUCATED ON RISKS OF ENTERING
[facility], INCLUDING BUT NOT LIMITED TO DEATH;PT VERBALIZES
UNDERSTANDING.N95 MASK PROVIDED, PT DECLINES USE OF GOWN OR GLOVES.

## 2021-02-22 VITALS — DIASTOLIC BLOOD PRESSURE: 50 MMHG | SYSTOLIC BLOOD PRESSURE: 83 MMHG

## 2021-02-22 VITALS — SYSTOLIC BLOOD PRESSURE: 107 MMHG | DIASTOLIC BLOOD PRESSURE: 55 MMHG

## 2021-02-22 VITALS — SYSTOLIC BLOOD PRESSURE: 84 MMHG | DIASTOLIC BLOOD PRESSURE: 47 MMHG

## 2021-02-22 VITALS — SYSTOLIC BLOOD PRESSURE: 91 MMHG | DIASTOLIC BLOOD PRESSURE: 58 MMHG

## 2021-02-22 VITALS — DIASTOLIC BLOOD PRESSURE: 56 MMHG | SYSTOLIC BLOOD PRESSURE: 96 MMHG

## 2021-02-22 VITALS — SYSTOLIC BLOOD PRESSURE: 96 MMHG | DIASTOLIC BLOOD PRESSURE: 50 MMHG

## 2021-02-22 VITALS — SYSTOLIC BLOOD PRESSURE: 78 MMHG | DIASTOLIC BLOOD PRESSURE: 47 MMHG

## 2021-02-22 VITALS — DIASTOLIC BLOOD PRESSURE: 60 MMHG | SYSTOLIC BLOOD PRESSURE: 102 MMHG

## 2021-02-22 VITALS — SYSTOLIC BLOOD PRESSURE: 98 MMHG | DIASTOLIC BLOOD PRESSURE: 52 MMHG

## 2021-02-22 VITALS — SYSTOLIC BLOOD PRESSURE: 87 MMHG | DIASTOLIC BLOOD PRESSURE: 46 MMHG

## 2021-02-22 VITALS — SYSTOLIC BLOOD PRESSURE: 90 MMHG | DIASTOLIC BLOOD PRESSURE: 46 MMHG

## 2021-02-22 VITALS — SYSTOLIC BLOOD PRESSURE: 86 MMHG | DIASTOLIC BLOOD PRESSURE: 50 MMHG

## 2021-02-22 LAB
ALBUMIN SERPL-MCNC: 2.1 G/DL (ref 3.2–5)
ALP SERPL-CCNC: 42 U/L (ref 38–126)
ANION GAP SERPL CALCULATED.3IONS-SCNC: 9 MMOL/L
AST SERPL-CCNC: 106 U/L (ref 9–36)
BASOPHILS NFR BLD AUTO: 0 % (ref 0–3)
BUN SERPL-MCNC: 33 MG/DL (ref 8–23)
BUN/CREAT SERPL: 21
CHLORIDE SERPL-SCNC: 104 MMOL/L (ref 95–108)
CO2 SERPL-SCNC: 23 MMOL/L (ref 22–30)
CREAT SERPL-MCNC: 1.6 MG/DL (ref 0.5–1)
CRP SERPL-MCNC: 18.8 MG/DL (ref 0–0.9)
EOSINOPHIL NFR BLD AUTO: 0 % (ref 0–8)
ERYTHROCYTE [DISTWIDTH] IN BLOOD BY AUTOMATED COUNT: 11.9 % (ref 11.5–15.5)
HCT VFR BLD AUTO: 31.4 % (ref 37–47)
HGB BLD-MCNC: 10.6 G/DL (ref 12–16)
IMM GRANULOCYTES NFR BLD: 0.6 % (ref 0–5)
LYMPHOCYTES NFR BLD: 11 % (ref 15–41)
MCH RBC QN AUTO: 31.2 PG  CALC (ref 26–32)
MCHC RBC AUTO-ENTMCNC: 33.8 G/DL CAL (ref 32–36)
MCV RBC AUTO: 92.4 FL  CALC (ref 80–100)
MONOCYTES NFR BLD AUTO: 6 % (ref 2–13)
NEUTROPHILS # BLD AUTO: 4.2 THOU/UL (ref 2–7.15)
NEUTROPHILS NFR BLD AUTO: 82 % (ref 42–76)
PLATELET # BLD AUTO: 62 THOU/UL (ref 130–400)
POTASSIUM SERPL-SCNC: 3.3 MMOL/L (ref 3.5–5.1)
PROT SERPL-MCNC: 4.1 G/DL (ref 6.3–8.2)
RBC # BLD AUTO: 3.4 MILL/UL (ref 4.2–5.6)
SODIUM SERPL-SCNC: 133 MMOL/L (ref 137–146)

## 2021-02-22 PROCEDURE — 0DH63UZ INSERTION OF FEEDING DEVICE INTO STOMACH, PERCUTANEOUS APPROACH: ICD-10-PCS | Performed by: SURGERY

## 2021-02-22 NOTE — NUR
PATIENT TAKEN DOWN TO SURGERY AT THIS TIME. PATIENT WAS SCHEDULED TO RECEIVE
KCL DIA AT THIS TIME BUT SURGERY NURSES STATED "WE WON'T HANG IT DOWN AT
SURGERY". DOSE HELD UNTIL PATIENT RETURNS FROM OPERATING ROOM.

## 2021-02-22 NOTE — NUR
PATIENT LAYING IN BED AT THIS TIME. AWAKE EYES OPEN. PATIENT IS NON VERBAL O2
ON AT 3L AND SPO2 IS 92%. CALL LIGHT IS WITHIN REACH. TELE MONITOR ON AND
BEING MONITORED BY ED. UP PATENT AND DRAINING CLEAR YELLOW URINE AT THIS
TIME. PATIENT REPOSITIONED. RIGHT UPPER SINGLE LUMEN PICC LINE PATENT AND
FLUSHED. SIERAILS UP X 2 CALL LIGHT WITHIN REACH. RN SHIFT ASSESSMENT DONE AT
THIS TIME.

## 2021-02-22 NOTE — NUR
CALLED ASKING TIME OF SURGERY FOR PEG TUBE. HE WOULD LIKE DR TO CALL
AND GIVE UPDATES. HE WILL BE CALLING AND COMING IN TODAY

## 2021-02-22 NOTE — NUR
PT SLEEPING. NO SIGNS OF REPIRATORY DISTRESS. OXYGEN ABOVE 90. UP STILL
INTACT. IV STILL RUNNING. PT REPOSITIONED. WILL CONTINUE TO MONITOR

## 2021-02-22 NOTE — NUR
ORDERED OBTAINED ON 2/21/21 BY JANUARY GAMINO FOR PEG TUBE PLACEMENT. PATIETN
SCHEDULED TO GO TO OPERATING ROOM AT 12:30PM. PATIENT CHECKLIST IN PLACE AND
CURRENT.  NOTIFIED BY THIS NURSE (PERICO) OF SURGERY TIME VIA PHONE
CONVERSATION.  TRANSFERED TO OPERATING ROOM TO OBTAIN CONSENT FOR
ANESTHESIA AT THIS TIME.

## 2021-02-22 NOTE — NUR
PATIENT RECEIVED BACK FROM PACU AT THIS TIME REPORT GIVEN BY RYDER FUNEZ. PATIENT
STABLE AT THIS TIME. PEG TUBE IS PATENT AND IN PLACE AT THIS TIME. PATIENT IS
NON-VERBAL AND FACIAL EXPRESSION SHOWS NO SIGNS OF PAIN. SURGICAL SITE
DRESSING IS DRY AND INTACT. PATIENT REPOSITIONED AND CALL LIGHT IS WITHIN
REACH SIDERAILS REMAIN UP X 2. O2 REMAINS ON AT 3LITERS.

## 2021-02-22 NOTE — NUR
PT REPOSITIONED. NO DISTRESS NOTED. UP REMAINS PATENT. IV FLUIDS CONTINUE
RUNNING. WILL CONTINUE TO MONITOR

## 2021-02-22 NOTE — NUR
PATIENT BP AT THIS TIME IS 78/47 AT THIS TIME ANDRIA SHAVER GAVE ORDERS
TO BOLUS 250MLS OF NORMAL SALINE AT THIS TIME AND RETAKE BP. WILL CONTINUE TO
MONITOR.

## 2021-02-22 NOTE — NUR
PATIENT RESTING IN BED AT THIS TIME-AWAKE ALERT BUT NON-VERBAL. PATIENT
POSITIONED ON LEFT SIDE. PATIENT INCONT OF MODERATE AMT OF SOFT BROWN STOOL.
PATIENT WAS PROVIDED WITH PERICARE AND UP CATH CARE WITH SOAP AND WATER.
BUTTOCKS IS RED AND BARRIER CREAM WAS APPLIED. PATIENT WAS TURNED AND
REPOSITION ON RIGHT SIDE. PATIENT IS NPO AND ORAL HYGEINE WAS PROVIDED USING
TOOTHETTE SPONGES AND MOUTH WASH. PATIENT WITH CONTRACTURES NOTED. PEG TUBE TO
LEFT ABD INTACT WITH DRESSING COVERING IT CDI. UP CATH PATENT AND DRAINING
YELLOW URINE. IV SITE TO RIGHT UPPER ARM PICC AT 75CC/HR. SITE APPEARS HEALTHY
AT THIS TIME. O2 VIA NASAL CANNULA IN PLACE AT 3LPM. O2 SATS 93%. TELE MONITOR
IN PLACE. CALL LIGHT IN REACH, WILL CONT TO MONITOR.

## 2021-02-23 VITALS — SYSTOLIC BLOOD PRESSURE: 119 MMHG | DIASTOLIC BLOOD PRESSURE: 62 MMHG

## 2021-02-23 VITALS — DIASTOLIC BLOOD PRESSURE: 81 MMHG | SYSTOLIC BLOOD PRESSURE: 134 MMHG

## 2021-02-23 VITALS — DIASTOLIC BLOOD PRESSURE: 52 MMHG | SYSTOLIC BLOOD PRESSURE: 131 MMHG

## 2021-02-23 VITALS — DIASTOLIC BLOOD PRESSURE: 70 MMHG | SYSTOLIC BLOOD PRESSURE: 131 MMHG

## 2021-02-23 VITALS — DIASTOLIC BLOOD PRESSURE: 70 MMHG | SYSTOLIC BLOOD PRESSURE: 140 MMHG

## 2021-02-23 VITALS — DIASTOLIC BLOOD PRESSURE: 73 MMHG | SYSTOLIC BLOOD PRESSURE: 129 MMHG

## 2021-02-23 LAB
ANION GAP SERPL CALCULATED.3IONS-SCNC: 11 MMOL/L
BUN SERPL-MCNC: 29 MG/DL (ref 8–23)
BUN/CREAT SERPL: 21
CHLORIDE SERPL-SCNC: 111 MMOL/L (ref 95–108)
CO2 SERPL-SCNC: 21 MMOL/L (ref 22–30)
CREAT SERPL-MCNC: 1.4 MG/DL (ref 0.5–1)
ERYTHROCYTE [DISTWIDTH] IN BLOOD BY AUTOMATED COUNT: 12 % (ref 11.5–15.5)
HCT VFR BLD AUTO: 30.9 % (ref 37–47)
HGB BLD-MCNC: 10.4 G/DL (ref 12–16)
MAGNESIUM SERPL-MCNC: 1.8 MG/DL (ref 1.6–2.3)
MCH RBC QN AUTO: 31.6 PG  CALC (ref 26–32)
MCHC RBC AUTO-ENTMCNC: 33.7 G/DL CAL (ref 32–36)
MCV RBC AUTO: 93.9 FL  CALC (ref 80–100)
PLATELET # BLD AUTO: 71 THOU/UL (ref 130–400)
POTASSIUM SERPL-SCNC: 4.1 MMOL/L (ref 3.5–5.1)
RBC # BLD AUTO: 3.29 MILL/UL (ref 4.2–5.6)
SODIUM SERPL-SCNC: 139 MMOL/L (ref 137–146)

## 2021-02-23 NOTE — NUR
PATIENT CHECK FOR GASTRIC RESIDUAL FLUID PRIOR TO FEEDING AT THIS TIME. THERE
 CC AND DUE TO AMOUNT OF RESIDUAL FLUID RETAINED FEEDING PLACE ON HOLD
AT THIS TIME.

## 2021-02-23 NOTE — NUR
PATIENT LAYING BED AT THIS TIME EYES OPEN NON VERBAL. PATIENT FACIAL
EXPRESSIONS ARE A 0 ON PAIN SCALE. SIDERAILS ARE UP X2 CALL LIGHT WITHIN
REACH. O2 REMAINS ON AND TELE MONITOR IN PLACE AND UP CONTINUES TO DRAIN
YELLOW URINE AT THIS TIME.

## 2021-02-23 NOTE — NUR
RECHECKED PATIENT GASTRIC REESIDUAL AMOUNT AT THIS TIME PRIOR TO FEEDING AND
60MLS PULLED BACK AT THIS TIME FEEDING ON HOLD DUE TO RESIDUAL BEING MORE THAN
50MLS AT THIS TIME.

## 2021-02-23 NOTE — NUR
PATEINT POSITIONED IN SEMIFOWLERS POSTION AT A 30 DEGREES. PEG TUBE CHECKED
FOR PLACEMENT AND THEN FLUSHED WITH 50CC OF WATER. THEN 237ML OF 1.5 STEVE
JEVITY INFUSED AT THIS TIME WITHOUT RESISTANCE FOLLOWED BY 150CC OF WATER.
PATIENT REMAINS IN SEMIFOWLERS POSITION AT 30 DEGREES AT THIS TIME. WILL
CONTINUE TO MONITOR.

## 2021-02-23 NOTE — NUR
PATIENT IN BED AT THIS TIME ALERT TO NAME BUT REMAINS NON VERBAL. HOSPICE
NURSE REMAINS ON SITE AND HAS OBTAINED A DNR WHICH IS NOW ON CHART. GASTRIC
PEG TUBE SITE REMAIN DRY AND PATENT AND WITHOUT S/S OF OUTSIDE INFECTION AT
THIS TIME. CALL LIGHT IS WITHIN REACH SIDERAILS UPX2 UP PATENT AND DRAINING
YELLOW URINE.

## 2021-02-23 NOTE — NUR
CHECK GASTRIC RESIDUAL CHECKED AT THIS TIME 125CC NOTED FEEDING ON HOLD WILL
RE-CHECK PRIOR TO NEXT FEEDING.

## 2021-02-23 NOTE — NUR
PATIENT LAYING IN BED AWAKE AT THIS TIME. PATIENT IS NONVERBAL. ABDOMINAL PEG
TUBE SITE DRY AND IN PLACE AT THIS TIME. ABD DRESSING TO COVER UNTIL USE. RN
ASSESSMENT DONE NOTIED EXPIRATORY WHEEZING REMAIN AND DIMINISHED LUNG SOUNDS
IN LOWER AND MID LUNG BASIS BILATERALLY. PATIENT HAS TELE MONITOR ON AND BEING
MONITORED BY ED. SPO2 IS 94% WITH 3L OF OXYGEN PER N/C. SIDERAILS ARE UP CALL
LIGHT WITHIN REACH. UP CATH PATENT AND DRAINING CLEAR YELLOW URINE. CATH
CARE GIVEN WARM WATER WITH SOAP. WILL CONTINUE TO MONITOR.

## 2021-02-23 NOTE — NUR
PATIENT RESTING IN BED WITH O2 VIA NASAL CANNULA IN PLACE. EYES ARE OPEN BUT
STILL APHASIC WITH NO VERBAL RESPONCES WHEN SPOKEN TO. LAB WORK DRAWN FROM
RIGHT UPPER ARM PICC WITH GOOD BLOOD RETURN. FLUSHED PER PROTOCOL WITH SALINE
AND IVF D5NS RESTARTED AT 75CC/HR. SITE REMAINS HEALTHY. UP CATH PATENT AND
DRAINING YELLOW URINE. TELE MONITOR IN PLACE. PATIENT TURNED AND REPOSITIONED
ON RIGHT SIDE. HEEL PROTECTORS IN PLACE. PEG TUBE TO LEFT ABD INTACT AND
DRESSING COVERING THE PEG TUBE REMAINS CDI. STILL NPO WITH NO TUBE FEEDINGS AT
THIS TIME. CALL LIGHT IN REACH. WILL CONT TO MONITOR.

## 2021-02-23 NOTE — NUR
PATIENT RESTING IN BED AT THIS TIME POSITIONED ON LEFT SIDE.  PATIENT
REPOSITIONED TO HIGH FOWLERS POSITION. PATIENT PEG TUBE CHECK FOR RESIDUAL-0
AT THIS TIME. PLACEMENT WAS CHECKED. JEVITY 1.5 1 BOX WAS GIVEN WITHOUT ANY
DIFFICULTY FOLLOWED BY 150CC H2O FLUSH. PATIENT REMAINS IN HIGH FOWLERS
POSITION. O2 VIA NASAL CANNULA IN PLACE AT 2LPM. UP PATENT AND DRAINING
YELLOW URINE. IVF D5NS PATENT AND INFUSING AT 75CC/HR VIA RIGHT UPPER ARM
PICC. PATIENT WITH LOOSE COUGH/CONGESTION. REMAINS NPO-ORAL HYGEINE PROVIDED
USING TOOTHETTE SPONGES AND MOUTHWASH. PATIENT REMAINS NON-VERBAL WHEN SPOKEN
TO BUT DOES FOLLOW YOU WITH HER EYES. RIGHT HAND WITH CONTRACTURE BUT PATIENT
IS ABLE TO MOVE THE LEFT HAND. CALL LIGHT IN REACH. WILL CONT TO MONITOR.

## 2021-02-23 NOTE — NUR
PATIENT RESTING IN BED WITH O2 VIA NASAL CANNULA IN PLACE. UP PATENT AND
DRAINING YELLOW URINE. IVF PATENT AND INFUSING VIA RIGHT UPPER ARM PICC.
REMAINS NPO. PATIENT WAS TURNED AND REPOSITIONED. ON LEFT SIDE. CALL LIGHT IN
REACH. WILL CONT TO MONITOR.

## 2021-02-24 VITALS — SYSTOLIC BLOOD PRESSURE: 121 MMHG | DIASTOLIC BLOOD PRESSURE: 61 MMHG

## 2021-02-24 VITALS — DIASTOLIC BLOOD PRESSURE: 61 MMHG | SYSTOLIC BLOOD PRESSURE: 123 MMHG

## 2021-02-24 VITALS — SYSTOLIC BLOOD PRESSURE: 142 MMHG | DIASTOLIC BLOOD PRESSURE: 63 MMHG

## 2021-02-24 VITALS — DIASTOLIC BLOOD PRESSURE: 62 MMHG | SYSTOLIC BLOOD PRESSURE: 107 MMHG

## 2021-02-24 LAB
ALBUMIN SERPL-MCNC: 2 G/DL (ref 3.2–5)
ALP SERPL-CCNC: 41 U/L (ref 38–126)
ANION GAP SERPL CALCULATED.3IONS-SCNC: 6 MMOL/L
AST SERPL-CCNC: 65 U/L (ref 9–36)
BASOPHILS NFR BLD AUTO: 0 % (ref 0–3)
BUN SERPL-MCNC: 27 MG/DL (ref 8–23)
BUN/CREAT SERPL: 21
CHLORIDE SERPL-SCNC: 114 MMOL/L (ref 95–108)
CO2 SERPL-SCNC: 24 MMOL/L (ref 22–30)
CREAT SERPL-MCNC: 1.3 MG/DL (ref 0.5–1)
CRP SERPL-MCNC: 3.9 MG/DL (ref 0–0.9)
EOSINOPHIL NFR BLD AUTO: 0 % (ref 0–8)
ERYTHROCYTE [DISTWIDTH] IN BLOOD BY AUTOMATED COUNT: 12.2 % (ref 11.5–15.5)
HCT VFR BLD AUTO: 30.4 % (ref 37–47)
HGB BLD-MCNC: 10 G/DL (ref 12–16)
IMM GRANULOCYTES NFR BLD: 2.9 % (ref 0–5)
LYMPHOCYTES NFR BLD: 8 % (ref 15–41)
MCH RBC QN AUTO: 31.3 PG  CALC (ref 26–32)
MCHC RBC AUTO-ENTMCNC: 32.9 G/DL CAL (ref 32–36)
MCV RBC AUTO: 95 FL  CALC (ref 80–100)
MONOCYTES NFR BLD AUTO: 6 % (ref 2–13)
NEUTROPHILS # BLD AUTO: 3.46 THOU/UL (ref 2–7.15)
NEUTROPHILS NFR BLD AUTO: 83 % (ref 42–76)
PLATELET # BLD AUTO: 102 THOU/UL (ref 130–400)
POTASSIUM SERPL-SCNC: 3.7 MMOL/L (ref 3.5–5.1)
PROT SERPL-MCNC: 3.8 G/DL (ref 6.3–8.2)
RBC # BLD AUTO: 3.2 MILL/UL (ref 4.2–5.6)
SODIUM SERPL-SCNC: 141 MMOL/L (ref 137–146)

## 2021-02-24 NOTE — NUR
PT RESTING IN RIGHT SIDE LAYING POSITION,NON-VERBAL AS PER BASELINE;VS
OBTAINED AND ASSESSMENT COMPLETED;RESPIRATIONS SHALLOW ON O2 @ 2L VIA NC-PT IS
NOT HOME OXYGEN DEPENDENT;NON-PRODUCTIVE COUGH NOTED AT TIMES;ABDOMEN
DISTENDED/SOFT ON PALPATION;PT RE-POSITIONED TO HER BACK AND HOB ELEVATED
TO HIGH FOWLERS;PEG TUBE TO LUQ CHECKED FOR PATENY VIA ASCULATION,
NO RESIDAL NOTED AT THIS TIME;TUBE FEEDING PROVIDED PER ORDER FOLLOW BY H20
FLUSH;UP CATHETER PATENT DRAINING TO GRAVITY WITH EASE;WEAK PEDAL PULSES,
HEEL PROTECTORS IN PLACE;GURINDER PICCLINE INFUSING D5 NS @ 75ML/HR,SITE APPEARS
HEALTHY;TELE MONITORING IN PLACE;NPO DIET REINFORCED;PT REMAINS IN AIR/CONTACT
PRECAUTIONS DUE TO COVID19 DX;ALL SAFETY PRECAUTIONS IN PLACE WITH BED IN THE
LOWEST POSITION AND CALL LIGHT IN REACH;WILL CONTINUE TO MONITOR

## 2021-02-24 NOTE — NUR
PATIENT RESTING IN BED AT THIS TIME-AWAKE ALERT AND POSITIONED ON HER RIGHT
SIDE. PATIENT REMAINS NON-VERBAL WHEN SPOKEN TO BUT DOES FOLLOW WITH HER EYES.
TELE MONITOR IN PLACE. LAB WORK DRAWN FROM RIGHT UPPER ARM PICC WITHOUT ANY
DIFFICULTY WITH GOOD BLOOD RETURN. PICC FLUSHED PER PROTOCOL AFTER LAB WORK
WAS DRAWN. AND IVF D5NS INFUSING AS ORDERED AT 75CC/HR. UP PATENT AND
DRAINING YELLOW URINE. PEG TUBE CLAMPED TO LEFT ABD CLAMPED AT THIS TIME. CALL
LIGHT IN REACH. WILL CONT TO MONITOR.

## 2021-02-24 NOTE — NUR
Discharge instructions given. Patient verbalizes understanding of same.
Discharged in stable condition via Medical Transport to Home with
*Other. All belongings sent with pt.
PT TRANSPORTED VIA [facility] FOR HOME HOSPICE.ALL BELONGINGD LEFT WITH
PT AT THIS TIME.

## 2021-02-24 NOTE — NUR
PT APPEARS TO BE RESTING IN HIGH FOWLERS POSITION;RESPIRATIONS SHALLOW ON O2 @
2L VIA NC;NO S/S OF DISTRESS NOTED;TELE MONITORING IN PLACE;GURINDER PICCLINE
REMAINS PATENT INFUSING D5 NS WITH EASE PER ORDER;UP CATHETER DRAINING TO
GRAVITY WITH EASE;NPO DIET;ALL SAFETY PRECAUTIONS REINFORCED WITH BED IN THE
LOWEST POSITION AND CALL LIGHT IN REACH;WILL CONTINUE TO MONITOR

## 2021-02-24 NOTE — NUR
PT RESTING IN SEMI FOWLERS POSITION WATCHING TV, PT REMAINS NON-VERBAL AS PER
BASELINE;RESPIRATIONS EVEN AND UNLABORED ON O2 @ 2L VIA NC;NO S/S OF DISTRESS
NOTED;UP CATHETER PATENT DRAINING TO GRAVITY WITH EASE;AWAITING [facility]
TO ARRIVE FOR TRANSPORT HOME FOR HOME HOSPICE;ASSESSMENT REMAINS UNCHANGED AT
THIS TIME;ALL SAFETY PRECAUTIONS REMAIN IN PLACE WITH CALL LIGHT IN REACH;WILL
CONTINUE TO MONITOR

## 2021-02-24 NOTE — NUR
ATTEMPTED TO COMPLETE TUBE FEEDING, 100CC OF YELLOW RESIDUAL
NOTED;FEED HELD AT THIS TIME;GURINDER SL PICCLINE REMOVED WITH CATHETER INTACT BY
DEE DEE HERRERA FOR D/C HOME AND PT TOLERATED WELL;\[facility]\"" 
FOR HOME TRANSPORT 1630;ALL SAFETY PRECAUTIONS REMAIN IN PLACE WITH BED IN THE
LOWEST POSITION AND CALL LIGHT IN REACH;WILL CONTINUE TO MONITOR

## 2021-02-24 NOTE — NUR
PATIENT SITTING IN HIGH FOWLERS POSITION. PATIENT AWAKE AND ALERT-STILL
NON-VERBAL BUT FOLLOWS YOU WITH HER EYES. PEG TUBE TO LEF ABD INTACT-RESIDUAL
CHECK AND WAS GREATER THAN 100CC OF GASTRIC CONTENTS. JEVITY TUBE FEEDING
HELD. PEG TUBE CLAMPED. TELE MONITOR IN PLACE. IVF D5NS PATENT AND INFUSING
VIA RIGHT UPPER ARM PICC AT 75CC/HR. UP PATENT AND DRAINING YELLOW URINE.
PATIENT REMAINS SITTING UP IN HIGH FOWLERS POSITION. O2 VIA NASAL CANNULA IN
PLACE. CALL LIGHT IN REACH. WILL CONT TO MONITOR.
